# Patient Record
Sex: MALE | HISPANIC OR LATINO
[De-identification: names, ages, dates, MRNs, and addresses within clinical notes are randomized per-mention and may not be internally consistent; named-entity substitution may affect disease eponyms.]

---

## 2021-12-17 ENCOUNTER — NON-APPOINTMENT (OUTPATIENT)
Age: 51
End: 2021-12-17

## 2021-12-17 ENCOUNTER — TRANSCRIPTION ENCOUNTER (OUTPATIENT)
Age: 51
End: 2021-12-17

## 2021-12-17 ENCOUNTER — APPOINTMENT (OUTPATIENT)
Dept: INTERNAL MEDICINE | Facility: CLINIC | Age: 51
End: 2021-12-17
Payer: COMMERCIAL

## 2021-12-17 VITALS
RESPIRATION RATE: 17 BRPM | DIASTOLIC BLOOD PRESSURE: 80 MMHG | SYSTOLIC BLOOD PRESSURE: 124 MMHG | TEMPERATURE: 97.5 F | HEART RATE: 86 BPM | BODY MASS INDEX: 31.65 KG/M2 | WEIGHT: 190 LBS | OXYGEN SATURATION: 98 % | HEIGHT: 65 IN

## 2021-12-17 DIAGNOSIS — E55.9 VITAMIN D DEFICIENCY, UNSPECIFIED: ICD-10-CM

## 2021-12-17 DIAGNOSIS — Z00.00 ENCOUNTER FOR GENERAL ADULT MEDICAL EXAMINATION W/OUT ABNORMAL FINDINGS: ICD-10-CM

## 2021-12-17 DIAGNOSIS — U07.1 COVID-19: ICD-10-CM

## 2021-12-17 DIAGNOSIS — J06.9 ACUTE UPPER RESPIRATORY INFECTION, UNSPECIFIED: ICD-10-CM

## 2021-12-17 DIAGNOSIS — Z91.19 PATIENT'S NONCOMPLIANCE WITH OTHER MEDICAL TREATMENT AND REGIMEN: ICD-10-CM

## 2021-12-17 DIAGNOSIS — Z86.39 PERSONAL HISTORY OF OTHER ENDOCRINE, NUTRITIONAL AND METABOLIC DISEASE: ICD-10-CM

## 2021-12-17 DIAGNOSIS — Z71.89 OTHER SPECIFIED COUNSELING: ICD-10-CM

## 2021-12-17 DIAGNOSIS — L91.0 HYPERTROPHIC SCAR: ICD-10-CM

## 2021-12-17 LAB
FLUAV SPEC QL CULT: NEGATIVE
FLUBV AG SPEC QL IA: NEGATIVE
S PYO AG SPEC QL IA: NEGATIVE

## 2021-12-17 PROCEDURE — 87804 INFLUENZA ASSAY W/OPTIC: CPT | Mod: 59,QW

## 2021-12-17 PROCEDURE — 99396 PREV VISIT EST AGE 40-64: CPT | Mod: 25

## 2021-12-17 PROCEDURE — 99497 ADVNCD CARE PLAN 30 MIN: CPT | Mod: 25

## 2021-12-17 PROCEDURE — 87880 STREP A ASSAY W/OPTIC: CPT | Mod: QW

## 2021-12-17 PROCEDURE — G0442 ANNUAL ALCOHOL SCREEN 15 MIN: CPT

## 2021-12-17 PROCEDURE — 99215 OFFICE O/P EST HI 40 MIN: CPT | Mod: 25

## 2021-12-17 PROCEDURE — 36415 COLL VENOUS BLD VENIPUNCTURE: CPT

## 2021-12-17 PROCEDURE — G0444 DEPRESSION SCREEN ANNUAL: CPT | Mod: 59

## 2021-12-17 NOTE — REVIEW OF SYSTEMS
[Negative] : Heme/Lymph [Postnasal Drip] : postnasal drip [Nasal Discharge] : nasal discharge [Sore Throat] : sore throat

## 2021-12-18 ENCOUNTER — NON-APPOINTMENT (OUTPATIENT)
Age: 51
End: 2021-12-18

## 2021-12-18 DIAGNOSIS — Z86.39 PERSONAL HISTORY OF OTHER ENDOCRINE, NUTRITIONAL AND METABOLIC DISEASE: ICD-10-CM

## 2021-12-18 LAB
25(OH)D3 SERPL-MCNC: 14.5 NG/ML
ALBUMIN SERPL ELPH-MCNC: 4.4 G/DL
ALP BLD-CCNC: 98 U/L
ALT SERPL-CCNC: 39 U/L
ANION GAP SERPL CALC-SCNC: 15 MMOL/L
APPEARANCE: CLEAR
AST SERPL-CCNC: 22 U/L
BASOPHILS # BLD AUTO: 0.04 K/UL
BASOPHILS NFR BLD AUTO: 0.5 %
BILIRUB SERPL-MCNC: 0.6 MG/DL
BILIRUBIN URINE: NEGATIVE
BLOOD URINE: NEGATIVE
BUN SERPL-MCNC: 13 MG/DL
CALCIUM SERPL-MCNC: 9.3 MG/DL
CHLORIDE SERPL-SCNC: 102 MMOL/L
CHOLEST SERPL-MCNC: 221 MG/DL
CO2 SERPL-SCNC: 21 MMOL/L
COLOR: NORMAL
CREAT SERPL-MCNC: 0.72 MG/DL
EOSINOPHIL # BLD AUTO: 0.57 K/UL
EOSINOPHIL NFR BLD AUTO: 7.2 %
ESTIMATED AVERAGE GLUCOSE: 206 MG/DL
GGT SERPL-CCNC: 46 U/L
GLUCOSE QUALITATIVE U: ABNORMAL
GLUCOSE SERPL-MCNC: 310 MG/DL
HBA1C MFR BLD HPLC: 8.8 %
HCT VFR BLD CALC: 43.9 %
HDLC SERPL-MCNC: 49 MG/DL
HGB BLD-MCNC: 15.4 G/DL
IMM GRANULOCYTES NFR BLD AUTO: 0.6 %
KETONES URINE: NORMAL
LDLC SERPL CALC-MCNC: 104 MG/DL
LEUKOCYTE ESTERASE URINE: NEGATIVE
LYMPHOCYTES # BLD AUTO: 1.15 K/UL
LYMPHOCYTES NFR BLD AUTO: 14.5 %
MAN DIFF?: NORMAL
MCHC RBC-ENTMCNC: 33.2 PG
MCHC RBC-ENTMCNC: 35.1 GM/DL
MCV RBC AUTO: 94.6 FL
MONOCYTES # BLD AUTO: 0.5 K/UL
MONOCYTES NFR BLD AUTO: 6.3 %
NEUTROPHILS # BLD AUTO: 5.61 K/UL
NEUTROPHILS NFR BLD AUTO: 70.9 %
NITRITE URINE: NEGATIVE
NONHDLC SERPL-MCNC: 172 MG/DL
PH URINE: 5.5
PLATELET # BLD AUTO: 183 K/UL
POTASSIUM SERPL-SCNC: 3.8 MMOL/L
PROT SERPL-MCNC: 7.9 G/DL
PROTEIN URINE: NEGATIVE
PSA SERPL-MCNC: 0.45 NG/ML
RBC # BLD: 4.64 M/UL
RBC # FLD: 12.8 %
SODIUM SERPL-SCNC: 138 MMOL/L
SPECIFIC GRAVITY URINE: 1.03
TRIGL SERPL-MCNC: 338 MG/DL
TSH SERPL-ACNC: 1.43 UIU/ML
UROBILINOGEN URINE: NORMAL
WBC # FLD AUTO: 7.92 K/UL

## 2021-12-18 RX ORDER — ATORVASTATIN CALCIUM 20 MG/1
20 TABLET, FILM COATED ORAL
Qty: 90 | Refills: 0 | Status: ACTIVE | COMMUNITY
Start: 2021-12-18 | End: 1900-01-01

## 2021-12-18 RX ORDER — CHOLECALCIFEROL (VITAMIN D3) 1250 MCG
1.25 MG CAPSULE ORAL
Qty: 8 | Refills: 0 | Status: ACTIVE | COMMUNITY
Start: 2021-12-18 | End: 1900-01-01

## 2021-12-19 PROBLEM — U07.1 COVID-19 VIRUS INFECTION: Status: ACTIVE | Noted: 2021-12-19

## 2021-12-19 PROBLEM — Z91.19 NONCOMPLIANCE: Status: RESOLVED | Noted: 2021-12-17 | Resolved: 2021-12-19

## 2021-12-19 PROBLEM — Z86.39 HISTORY OF VITAMIN D DEFICIENCY: Status: RESOLVED | Noted: 2021-12-18 | Resolved: 2021-12-19

## 2021-12-19 PROBLEM — L91.0 KELOID SCAR: Status: ACTIVE | Noted: 2021-12-17

## 2021-12-19 PROBLEM — E55.9 VITAMIN D DEFICIENCY: Status: ACTIVE | Noted: 2021-12-19

## 2021-12-19 PROBLEM — Z91.19 NONCOMPLIANCE: Status: ACTIVE | Noted: 2021-12-19

## 2021-12-19 LAB
RAPID RVP RESULT: DETECTED
SARS-COV-2 RNA PNL RESP NAA+PROBE: DETECTED

## 2021-12-19 NOTE — ADDENDUM
[FreeTextEntry1] : Call patient this morning at 8 AM and made him aware that his Covid test was positive.  He understands that he needs to quarantine.  He has a head cold without fever or shortness of breath.  Patient also made aware that his blood sugars were elevated and he needs to take Metformin at thousand twice a day.  Switched his medication to a atorvastatin for cholesterol recommended Metformin 1000 twice daily started vitamin D 50,000 weekly.. Patient told to have all his intimate contacts tested.  For Covid.

## 2021-12-19 NOTE — DATA REVIEWED
[FreeTextEntry1] : Rapid flu rapid strep negative= Covid PCR positive vitamin D 14.5.  Glucose 310.  Triglycerides 338 cholesterol 221 A1c 8.8.

## 2021-12-19 NOTE — PHYSICAL EXAM
[No Acute Distress] : no acute distress [Well Nourished] : well nourished [Well Developed] : well developed [Well-Appearing] : well-appearing [Normal Sclera/Conjunctiva] : normal sclera/conjunctiva [PERRL] : pupils equal round and reactive to light [EOMI] : extraocular movements intact [Normal Outer Ear/Nose] : the outer ears and nose were normal in appearance [No JVD] : no jugular venous distention [No Lymphadenopathy] : no lymphadenopathy [Supple] : supple [Thyroid Normal, No Nodules] : the thyroid was normal and there were no nodules present [No Respiratory Distress] : no respiratory distress  [No Accessory Muscle Use] : no accessory muscle use [Clear to Auscultation] : lungs were clear to auscultation bilaterally [Normal Rate] : normal rate  [Regular Rhythm] : with a regular rhythm [Normal S1, S2] : normal S1 and S2 [No Murmur] : no murmur heard [No Carotid Bruits] : no carotid bruits [No Abdominal Bruit] : a ~M bruit was not heard ~T in the abdomen [No Varicosities] : no varicosities [Pedal Pulses Present] : the pedal pulses are present [No Edema] : there was no peripheral edema [No Palpable Aorta] : no palpable aorta [No Extremity Clubbing/Cyanosis] : no extremity clubbing/cyanosis [Soft] : abdomen soft [Non Tender] : non-tender [Non-distended] : non-distended [No Masses] : no abdominal mass palpated [No HSM] : no HSM [Normal Bowel Sounds] : normal bowel sounds [Normal Posterior Cervical Nodes] : no posterior cervical lymphadenopathy [Normal Anterior Cervical Nodes] : no anterior cervical lymphadenopathy [No CVA Tenderness] : no CVA  tenderness [No Spinal Tenderness] : no spinal tenderness [No Joint Swelling] : no joint swelling [Grossly Normal Strength/Tone] : grossly normal strength/tone [No Rash] : no rash [Coordination Grossly Intact] : coordination grossly intact [No Focal Deficits] : no focal deficits [Normal Gait] : normal gait [Deep Tendon Reflexes (DTR)] : deep tendon reflexes were 2+ and symmetric [Normal Affect] : the affect was normal [Normal Insight/Judgement] : insight and judgment were intact [de-identified] : Obese [de-identified] : Pharynx injected, PNDrip noted [de-identified] : keloid scars posterior head nape of neck is covered in keloid scar==

## 2021-12-19 NOTE — ASSESSMENT
[FreeTextEntry1] : Medical Annual wellness visit completed:\par HRA completed and reviewed with patient\par Medical, family, surgical history reviewed with patient and updated\par List of current providers r/w patient and updated\par Vitals, BMI reviewed and discussed along with healthy BMI goals. Dietary counseling x 15 minutes provided\par Depression PHQ 9 completed and reviewed \par Annual safety assessment reviewed\par discussed advanced directives\par smoking cessation counseling provided\par Established routine screening and immunization schedules\par \par VACCINATION & OTHER TX RECOMMENDATIONS\par \par ASA preventative therapy\par Calcium/Vitamin D supplementation\par  \par Dietary counseling, nutrition referral\par risks vs. benefits d/w patient. routine vaccination and vaccination schedules and recommendation d/w patient\par \par Vaccines recommended: \par * pneumovax (once after 65) & prevnar\par * annual Influenza vaccine\par * Hep B vaccines\par * zostavax\par * Tdap\par \par Colorectal screening recommended; screening colonoscopy q10yr, flex sig q5yr, annual fecal occult testing\par BMD recommended biennially for osteoporosis screening\par Glaucoma screening recommended, annual optho evals\par Cardiovascular screening and blood tests recommended and discussed w/ patient, cholesterol screening and dietary counseling\par AAA recommended x 1\par \par \par Met with SERENA WEBER, who was willing to discuss advance care planning. \par Our advance care planning conversation included a discussion about:\par 1. The value and importance of advance care planning.\par 2. Experiences with loved ones who have been seriously ill or have .\par 3. Exploration of personal, cultural, or spiritual beliefs that might influence medical decisions.\par 4. Exploration of goals of care in the event of a sudden injury or illness.\par 5. Identification of a health care agent.\par 6. Review and update, or completion of, an advance directive.\par Start time: 1115 End time: 1130\par \par diet and exercise weight loss.  Low-salt low-fat ADA diet/ htn- Discussed diabetes physiology\par - Discussed importance of monitoring blood glucose levels\par - Encouraged a low fat/low cholesterol diet\par - Discussed symptoms of hyperglycemia and hypoglycemia\par - Discussed ADA glucose goals\par - Discussed  HGB A1c and the effects of blood glucose on the level\par - Discussed Healthy eating, avoidance of concentrated sweets, and to include vegetables by at least 2 meals a day\par - Discussed regular exercise\par - Discussed importance of follow up physician visits Limit intake of Sodium (Salt) to less than 2 grams a day to prevent fluid retention-swelling or worsening of symptoms. The importance of keeping the blood pressure at or below 130/80 to prevent stroke, heart attacks, kidney failure, blindness, and loss of limbs was  low chol diet. Avoid fried foods, red meat, butter, eggs, hard cheeses. Use canola or olive oil preferred. ::  was established in which goals would be set, monitoring would be done, and problem solving would also be addressed. The patient would be assisted using behavior change techniques, such as self-help and counseling through behavioral modification: Problem solving using hypnosis and positive medical reinforcement to achieve agreed-upon goals.\par \par Symptomatic patients : Test for influenza, if positive, treat for influenza and do not continue below. \par 1. Fever plus cough or shortness of breath : Test for RVP and COVID-19.\par 2.Indirect, circumstantial or unclear exposure to COVID-19, or other concerning cases not meeting above criteria: Please call [facility] to discuss testing. \par +++ All above cases must be reported to the [facility] registry. +++\par \par Asymptomatic patients: \par 1. Known first-degree direct-contact exposure to positive COVID-19 patient but asymptomatic: No testing PLUS 14 day self-quarantine. Pt to call if symptoms develop. Report to [facility] Registry.\par 2. No known exposure and asymptomatic, referred from outside healthcare organization: Please call [facility] to discuss testing. \par 3.All other asymptomatic patients with no known exposures: no testing, no exceptions.\par \par \par

## 2021-12-19 NOTE — HISTORY OF PRESENT ILLNESS
[FreeTextEntry1] : annual wellness, f/u, GHM, htn, diabetes, lipids, obesity, vitamin d def. chief complaint head cold, nasal congestion and cough. [de-identified] : 52 y/o M presents for f/u, GHM, annual wellness\par \par Pt has hx of htn, diabetes, lipids, obesity, vitamin d def\par Pt denies fever, cough, body aches, chills and SOB\par Patient developed nasal congestion and cough feels otherwise okay\par Fully vaccinated against covid-19\par Hx of keloid scars, posterior head/neck \par Pt voices no further acute complaints\par ROS as documented below

## 2021-12-19 NOTE — HEALTH RISK ASSESSMENT
[Good] : ~his/her~  mood as  good [Never] : Never [Yes] : Yes [3 or 4 (1 pt)] : 3 or 4  (1 point) [No falls in past year] : Patient reported no falls in the past year [1] : 2) Feeling down, depressed, or hopeless for several days (1) [PHQ-2 Positive] : PHQ-2 Positive [Several Days (1)] : 7.) Trouble concentrating on things, such as reading a newspaper or watching television? Several days [Not at All (0)] : 8.) Moving or speaking so slowly that other people could have noticed, or the opposite, moving or speaking faster than usual? Not at all [None] : None [Employed] : employed [Less Than High School] : less than high school [] :  [# Of Children ___] : has [unfilled] children [Sexually Active] : sexually active [Feels Safe at Home] : Feels safe at home [Fully functional (bathing, dressing, toileting, transferring, walking, feeding)] : Fully functional (bathing, dressing, toileting, transferring, walking, feeding) [Fully functional (using the telephone, shopping, preparing meals, housekeeping, doing laundry, using] : Fully functional and needs no help or supervision to perform IADLs (using the telephone, shopping, preparing meals, housekeeping, doing laundry, using transportation, managing medications and managing finances) [Reports normal functional visual acuity (ie: able to read med bottle)] : Reports normal functional visual acuity [Smoke Detector] : smoke detector [Carbon Monoxide Detector] : carbon monoxide detector [Safety elements used in home] : safety elements used in home [Reviewed no changes] : Reviewed, no changes [Designated Healthcare Proxy] : Designated healthcare proxy [Name: ___] : Health Care Proxy's Name: [unfilled]  [Relationship: ___] : Relationship: [unfilled] [Aggressive treatment] : aggressive treatment [Last Verification Date: ___] : Last Verification Date: [unfilled] [I will adhere to the patient's wishes.] : I will adhere to the patient's wishes. [Time Spent: ___ minutes] : Time Spent: [unfilled] minutes [Audit-CScore] : 1 [[id number]] : 4 [Change in mental status noted] : No change in mental status noted [Language] : denies difficulty with language [Behavior] : denies difficulty with behavior [Learning/Retaining New Information] : denies difficulty learning/retaining new information [Handling Complex Tasks] : denies difficulty handling complex tasks [Reports changes in hearing] : Reports no changes in hearing [Reports changes in vision] : Reports no changes in vision [Reports changes in dental health] : Reports no changes in dental health [Guns at Home] : no guns at home [Travel to Developing Areas] : does not  travel to developing areas

## 2021-12-21 ENCOUNTER — NON-APPOINTMENT (OUTPATIENT)
Age: 51
End: 2021-12-21

## 2021-12-23 ENCOUNTER — APPOINTMENT (OUTPATIENT)
Dept: INTERNAL MEDICINE | Facility: CLINIC | Age: 51
End: 2021-12-23

## 2022-02-10 ENCOUNTER — RX RENEWAL (OUTPATIENT)
Age: 52
End: 2022-02-10

## 2022-12-19 ENCOUNTER — APPOINTMENT (OUTPATIENT)
Dept: PHYSICAL MEDICINE AND REHAB | Facility: CLINIC | Age: 52
End: 2022-12-19

## 2022-12-19 VITALS
WEIGHT: 195 LBS | DIASTOLIC BLOOD PRESSURE: 90 MMHG | OXYGEN SATURATION: 99 % | TEMPERATURE: 97.1 F | BODY MASS INDEX: 32.49 KG/M2 | SYSTOLIC BLOOD PRESSURE: 130 MMHG | HEIGHT: 65 IN | HEART RATE: 94 BPM

## 2022-12-19 DIAGNOSIS — Z86.79 PERSONAL HISTORY OF OTHER DISEASES OF THE CIRCULATORY SYSTEM: ICD-10-CM

## 2022-12-19 DIAGNOSIS — Z86.39 PERSONAL HISTORY OF OTHER ENDOCRINE, NUTRITIONAL AND METABOLIC DISEASE: ICD-10-CM

## 2022-12-19 DIAGNOSIS — E78.49 OTHER HYPERLIPIDEMIA: ICD-10-CM

## 2022-12-19 PROCEDURE — 99072 ADDL SUPL MATRL&STAF TM PHE: CPT

## 2022-12-19 PROCEDURE — 99204 OFFICE O/P NEW MOD 45 MIN: CPT

## 2022-12-20 ENCOUNTER — RX CHANGE (OUTPATIENT)
Age: 52
End: 2022-12-20

## 2022-12-25 NOTE — REVIEW OF SYSTEMS
[Joint Pain] : joint pain [Joint Stiffness] : joint stiffness [Muscle Pain] : muscle pain [Eye Pain] : no eye pain [Earache] : no earache [Chest Pain] : no chest pain [Shortness Of Breath] : no shortness of breath [Abdominal Pain] : no abdominal pain [Dysuria] : no dysuria [Skin Wound] : no skin wound [Dizziness] : no dizziness [Insomnia] : no insomnia [Easy Bruising] : no tendency for easy bruising

## 2022-12-25 NOTE — PHYSICAL EXAM
[FreeTextEntry1] : Pleasant, in no distress. Language: English\par HEENT: Head: no trauma. Eyes: no discharge. Ears: No discharge. Nose No discharge. Throat: clear\par Neck: FAROM. Negative Spurlings\par Heart: RR, +S1, S2\par Lungs: CTA\par Abdomen: soft, NT\par Lumbar spine: AROM 0-70, diffuse spasms of the left greater than right lumbar paraspinals\par \par LUE: Shoulder:FAROM, MS 5/5\par Elbow: FAROM, MS 5/5 reflexes 2/4\par Wrist: FAROM, MS 5/5 reflexes 2/4\par Warm, nontender, pulse 2+\par \par RUE:Shoulder:FAROM, MS 5/5\par Elbow: FAROM, MS 5/5 reflexes 2/4\par Wrist: FAROM, MS 5/5 reflexes 2/4\par Warm, nontender, pulse 2+\par \par LLE: Hip: FAROM, MS 4+/5\par Knee: FAROM, MS 5-/5 reflexes 2/4\par Ankle: FAROM, MS 5/5 reflexes 2/4\par Warm , nontender, pulse 2+ negative homans\par \par RLE: Hip: FAROM, MS 4+/5\par Knee: FAROM, MS 5-/5 reflexes 2/4\par Ankle: FAROM, MS 5/5 reflexes 2/4\par Warm , nontender, pulse 2+ negative homans\par \par Gait: Spontaneous, reciprocal, safe without an assistive device\par \par Sensation\par RUE: sensation is intact to light touch, pinprick  and proprioception\par LUE: sensation is intact to light touch, pinprick  and proprioception\par RLE: sensation is intact to light touch, pinprick  and proprioception. Neg SLR. Neg JELANI, Neg FADIR\par LLE: sensation is intact to light touch, pinprick  and proprioception positive SLR. Neg JELANI, Neg FADIR\par \par \par

## 2022-12-25 NOTE — HISTORY OF PRESENT ILLNESS
[FreeTextEntry1] : Workers Compensation\par Date of Accident 10.28. 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 He was was trying to remove a manual pallet juan jose from a pallet of medical supplies\par \par The juan jose became stuck. He pulled the juan jose hard toward him. The juan jose ran into him and he fell backward. He landed hard on his back. No LOC. He had low back pain at the scene. He went to ER at [facility],  CT scan of the lumbar spine did not reveal a fracture\par \par X-ray of the lumbar spine performed on October 20, 2022 reveals no fracture\par \par He was seen by Dr. Анна Silva,  chiropracter. He has been to chiropractic care for 3 times a week.\par He had persistent pain and was referred to Dr. Weller of [facility] pain management for an evaluation..\par \par MRI of the lumbar spine performed on December 9, 2022 reveals there is a disc bulge resulting in mild to moderate bilateral foraminal stenosis with impingement on the exiting L5 roots.  There is mild right and moderate left facet arthropathy with a small left facet effusion.\par Received trigger points to the low back.  He has persistent pain.\par \par Pain:  8/10 Worse: 10/10 Quality: sharp Frequency: constant\par The pain starts on the right side and radiates to the left lateral side\par No radiation down the leg\par The pain is worse with bending\par Aggravated with walking and sitting more than 30 minutes.\par \par He takes methocarbamol\par He was not taken nsaids\par \par No previous WC case\par No previous accident to the low back\par \par Functional deficits\par [place] is able to perform ADLs very slowly due to pain\par \par He drove to the office\par He has been off work since the day of the accident.\par \par

## 2023-01-19 ENCOUNTER — APPOINTMENT (OUTPATIENT)
Dept: PHYSICAL MEDICINE AND REHAB | Facility: CLINIC | Age: 53
End: 2023-01-19
Payer: OTHER MISCELLANEOUS

## 2023-01-19 VITALS
RESPIRATION RATE: 17 BRPM | SYSTOLIC BLOOD PRESSURE: 126 MMHG | OXYGEN SATURATION: 98 % | BODY MASS INDEX: 32.49 KG/M2 | WEIGHT: 195 LBS | HEART RATE: 95 BPM | HEIGHT: 65 IN | DIASTOLIC BLOOD PRESSURE: 80 MMHG | TEMPERATURE: 98 F

## 2023-01-19 PROCEDURE — 99072 ADDL SUPL MATRL&STAF TM PHE: CPT

## 2023-01-19 PROCEDURE — 99213 OFFICE O/P EST LOW 20 MIN: CPT

## 2023-01-19 RX ORDER — TIZANIDINE HYDROCHLORIDE 2 MG/1
2 CAPSULE ORAL
Qty: 30 | Refills: 0 | Status: ACTIVE | COMMUNITY
Start: 2023-01-19 | End: 1900-01-01

## 2023-01-23 NOTE — HISTORY OF PRESENT ILLNESS
[FreeTextEntry1] : Workers Compensation\par Date of Accident 10.28. 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 He was was trying to remove a manual pallet juan jose from a pallet of medical supplies\par \par The juan jose became stuck. He pulled the juan jose hard toward him. The juan jose ran into him and he fell backward. He landed hard on his back. No LOC. He had low back pain at the scene. He went to ER at [facility],  CT scan of the lumbar spine did not reveal a fracture\par \par X-ray of the lumbar spine performed on October 20, 2022 reveals no fracture\par \par He was seen by Dr. Анна Silva,  chiropracter. He has been to chiropractic care for 3 times a week.\par He had persistent pain and was referred to Dr. Weller of [facility] pain management for an evaluation..\par \par MRI of the lumbar spine performed on December 9, 2022 reveals there is a disc bulge resulting in mild to moderate bilateral foraminal stenosis with impingement on the exiting L5 roots.  There is mild right and moderate left facet arthropathy with a small left facet effusion.\par Received trigger points to the low back.  He has persistent pain.\par \par The patient has not received approval for physical therapy since his last office visit.\par \par According to the patient he has not received any restorative physical therapy since his initial injury.\par \par Low back pain\par Pain:  8/10 Worse: 10/10 Quality: sharp Frequency: constant\par The pain starts on the right side and radiates to the left lateral side\par No radiation down the leg\par The pain is worse with bending\par Aggravated with walking and sitting more than 30 minutes.\par \par He takes methocarbamol\par He was not taken nsaids\par \par No previous WC case\par No previous accident to the low back\par \par Functional deficits\par He is able to perform ADLs but very slowly due to pain\par He has increased pain in his low back with lifting.  He has increased pain with walking more than several blocks.\par \par He drove to the office\par He has been off work since the day of the accident.\par \par

## 2023-01-30 ENCOUNTER — RX CHANGE (OUTPATIENT)
Age: 53
End: 2023-01-30

## 2023-01-30 ENCOUNTER — APPOINTMENT (OUTPATIENT)
Dept: PHYSICAL MEDICINE AND REHAB | Facility: CLINIC | Age: 53
End: 2023-01-30
Payer: OTHER MISCELLANEOUS

## 2023-01-30 VITALS
WEIGHT: 193 LBS | BODY MASS INDEX: 32.15 KG/M2 | OXYGEN SATURATION: 98 % | DIASTOLIC BLOOD PRESSURE: 82 MMHG | SYSTOLIC BLOOD PRESSURE: 144 MMHG | TEMPERATURE: 97.5 F | HEIGHT: 65 IN | HEART RATE: 93 BPM

## 2023-01-30 PROCEDURE — 99213 OFFICE O/P EST LOW 20 MIN: CPT

## 2023-01-30 PROCEDURE — 99072 ADDL SUPL MATRL&STAF TM PHE: CPT

## 2023-01-31 NOTE — HISTORY OF PRESENT ILLNESS
[FreeTextEntry1] : Workers Compensation\par Date of Accident 10.28. 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 He was was trying to remove a manual pallet juan jose from a pallet of medical supplies\par \par The juan jose became stuck. He pulled the juan jose hard toward him. The juan jose ran into him and he fell backward. He landed hard on his back. No LOC. He had low back pain at the scene. [facility]  CT scan of the lumbar spine did not reveal a fracture\par \par X-ray of the lumbar spine performed on October 20, 2022 reveals no fracture\par \par He was seen by Dr. Анна Silva,  chiropracter. He has been to chiropractic care for 3 times a week.\par He had persistent pain and was referred to Dr. Weller of [facility] pain management for an evaluation..\par \par MRI of the lumbar spine performed on December 9, 2022 reveals there is a disc bulge resulting in mild to moderate bilateral foraminal stenosis with impingement on the exiting L5 roots.  There is mild right and moderate left facet arthropathy with a small left facet effusion.\par Received trigger points to the low back.  He has persistent pain.\par \par The patient has not received approval for physical therapy since his last office visit.\par \par According to the patient he has not received any restorative physical therapy since his initial injury.\par \par The patient returns today stating that he wishes to return to work at an light duty status.\par He has recently been approved for restorative physical therapy\par His medications were approved by Worker's Compensation\par He has been performing stretching and simple exercises in the gym to increase his endurance and back strength\par \par Low back pain\par Pain:  8/10 Worse: 10/10 Quality: sharp Frequency: constant\par The pain starts on the right side and radiates to the left lateral side\par No radiation down the leg\par The pain is worse with bending\par Aggravated with walking and sitting more than 30 minutes.\par \par He takes methocarbamol\par He was not taken nsaids\par \par No previous WC case\par No previous accident to the low back\par \par Functional deficits\par He is able to perform ADLs but very slowly due to pain\par He has increased pain in his low back with lifting.  He has increased pain with walking more than several blocks.\par \par He drove to the office\par He has been off work since the day of the accident.\par \par

## 2023-02-03 ENCOUNTER — RX CHANGE (OUTPATIENT)
Age: 53
End: 2023-02-03

## 2023-02-03 RX ORDER — TIZANIDINE HYDROCHLORIDE 2 MG/1
2 CAPSULE ORAL
Qty: 30 | Refills: 0 | Status: DISCONTINUED | COMMUNITY
Start: 2022-12-19 | End: 2023-02-03

## 2023-02-03 RX ORDER — NAPROXEN 500 MG/1
500 TABLET ORAL
Qty: 60 | Refills: 0 | Status: DISCONTINUED | COMMUNITY
Start: 2022-12-19 | End: 2023-02-03

## 2023-02-03 RX ORDER — AZITHROMYCIN 250 MG/1
250 TABLET, FILM COATED ORAL
Qty: 1 | Refills: 0 | Status: DISCONTINUED | COMMUNITY
Start: 2021-12-17 | End: 2023-02-03

## 2023-02-21 ENCOUNTER — APPOINTMENT (OUTPATIENT)
Dept: PHYSICAL MEDICINE AND REHAB | Facility: CLINIC | Age: 53
End: 2023-02-21

## 2023-02-21 VITALS
TEMPERATURE: 97.1 F | WEIGHT: 193 LBS | HEART RATE: 94 BPM | OXYGEN SATURATION: 97 % | BODY MASS INDEX: 32.15 KG/M2 | HEIGHT: 65 IN | SYSTOLIC BLOOD PRESSURE: 126 MMHG | DIASTOLIC BLOOD PRESSURE: 86 MMHG | RESPIRATION RATE: 17 BRPM

## 2023-02-27 NOTE — HISTORY OF PRESENT ILLNESS
[FreeTextEntry1] : Workers Compensation\par Date of Accident 10.28. 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 He was was trying to remove a manual pallet juan jose from a pallet of medical supplies\par \par The juan jose became stuck. He pulled the juan jose hard toward him. The juan jose ran into him and he fell backward. He landed hard on his back. No LOC. He had low back pain at the scene. He went to ER at [facility],  CT scan of the lumbar spine did not reveal a fracture\par \par X-ray of the lumbar spine performed on October 20, 2022 reveals no fracture\par \par He was seen by Dr. Анна Silva,  chiropracter. He has been to chiropractic care for 3 times a week.\par He had persistent pain and was referred to Dr. Weller of [facility] pain management for an evaluation..\par \par MRI of the lumbar spine performed on December 9, 2022 reveals there is a disc bulge resulting in mild to moderate bilateral foraminal stenosis with impingement on the exiting L5 roots.  There is mild right and moderate left facet arthropathy with a small left facet effusion.\par Received trigger points to the low back.  He has persistent pain.\par \par According to the patient he has not received any restorative physical therapy since his initial injury.\par \par The patient has recently been approved for restorative therapy. Therapy has decreased the pain and range of motion of his low back.\par \par \par The patient is at work at an light duty status.\par He has recently been approved for restorative physical therapy\par His medications were approved by Worker's Compensation\par He has been performing stretching and simple exercises in the gym to increase his endurance and back strength\par \par Low back pain\par Pain:  8/10 Worse: 10/10 Quality: sharp Frequency: constant\par The pain starts on the right side and radiates to the left lateral side\par No radiation down the leg\par The pain is worse with bending\par Aggravated with walking and sitting more than 30 minutes.\par \par He takes methocarbamol\par He was not taken nsaids\par \par No previous WC case\par No previous accident to the low back\par \par Functional deficits\par He is able to perform ADLs but very slowly due to pain\par He has increased pain in his low back with lifting.  He has increased pain with walking more than several blocks.\par \par He drove to the office\par He has been off work since the day of the accident.\par \par

## 2023-02-27 NOTE — PHYSICAL EXAM
[FreeTextEntry1] : Pleasant, in no distress. Language: English\par HEENT: Head: no trauma. Eyes: no discharge. Ears: No discharge. Nose No discharge. Throat: clear\par Neck: FAROM. Negative Spurlings\par Heart: RR, +S1, S2\par Lungs: CTA\par Abdomen: soft, NT\par Lumbar spine: AROM 0-80, diffuse spasms of the left greater than right lumbar paraspinals\par \par LUE: Shoulder:FAROM, MS 5/5\par Elbow: FAROM, MS 5/5 reflexes 2/4\par Wrist: FAROM, MS 5/5 reflexes 2/4\par Warm, nontender, pulse 2+\par \par RUE:Shoulder:FAROM, MS 5/5\par Elbow: FAROM, MS 5/5 reflexes 2/4\par Wrist: FAROM, MS 5/5 reflexes 2/4\par Warm, nontender, pulse 2+\par \par LLE: Hip: FAROM, MS 4+/5\par Knee: FAROM, MS 5-/5 reflexes 2/4\par Ankle: FAROM, MS 5/5 reflexes 2/4\par Warm , nontender, pulse 2+ negative homans\par \par RLE: Hip: FAROM, MS 4+/5\par Knee: FAROM, MS 5-/5 reflexes 2/4\par Ankle: FAROM, MS 5/5 reflexes 2/4\par Warm , nontender, pulse 2+ negative homans\par \par Gait: Spontaneous, reciprocal, safe without an assistive device\par \par Sensation\par RUE: sensation is intact to light touch, pinprick  and proprioception\par LUE: sensation is intact to light touch, pinprick  and proprioception\par RLE: sensation is intact to light touch, pinprick  and proprioception. Neg SLR. Neg JELANI, Neg FADIR\par LLE: sensation is intact to light touch, pinprick  and proprioception positive SLR. Neg JELANI, Neg FADIR\par

## 2023-02-28 ENCOUNTER — APPOINTMENT (OUTPATIENT)
Dept: PHYSICAL MEDICINE AND REHAB | Facility: CLINIC | Age: 53
End: 2023-02-28
Payer: OTHER MISCELLANEOUS

## 2023-02-28 VITALS
OXYGEN SATURATION: 98 % | BODY MASS INDEX: 32.15 KG/M2 | WEIGHT: 193 LBS | HEART RATE: 86 BPM | SYSTOLIC BLOOD PRESSURE: 122 MMHG | DIASTOLIC BLOOD PRESSURE: 82 MMHG | RESPIRATION RATE: 17 BRPM | HEIGHT: 65 IN | TEMPERATURE: 96.5 F

## 2023-02-28 PROCEDURE — 99072 ADDL SUPL MATRL&STAF TM PHE: CPT

## 2023-02-28 PROCEDURE — 99213 OFFICE O/P EST LOW 20 MIN: CPT

## 2023-03-01 NOTE — HISTORY OF PRESENT ILLNESS
[FreeTextEntry1] : Workers Compensation\par Date of Accident 10.28. 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 He was was trying to remove a manual pallet juan jose from a pallet of medical supplies\par \par The juan jose became stuck. He pulled the juan jose hard toward him. The juan jose ran into him and he fell backward. He landed hard on his back. No LOC. He had low back pain at the scene. He went to ER at [facility],  CT scan of the lumbar spine did not reveal a fracture\par \par X-ray of the lumbar spine performed on October 20, 2022 reveals no fracture\par \par He was seen by Dr. Анна Silva,  chiropracter. He has been to chiropractic care for 3 times a week.\par He had persistent pain and was referred to Dr. Weller of [facility] pain management for an evaluation..\par \par MRI of the lumbar spine performed on December 9, 2022 reveals there is a disc bulge resulting in mild to moderate bilateral foraminal stenosis with impingement on the exiting L5 roots.  There is mild right and moderate left facet arthropathy with a small left facet effusion.\par Received trigger points to the low back.  He has persistent pain.\par \par The patient is at work at an light duty status.\par He has recently been approved for restorative physical therapy\par He has been attending restorative therapy 2 x week for 8 sessions\par He has improved range of motion in the low back with less pain.\par He has returned to work at light duty. He does not lift more than 20 lbs. \par His medications were approved by Worker's Compensation\par He has been performing stretching and simple exercises in the gym to increase his endurance and back strength\par \par Low back pain\par Pain:  7/10 Worse: 10/10 Quality: sharp Frequency: constant\par The pain starts on the right side and radiates to the left lateral side\par No radiation down the leg\par The pain is worse with bending\par Aggravated with walking and sitting more than 40 minutes.\par \par He takes methocarbamol\par He was not taken nsaids\par \par No previous WC case\par No previous accident to the low back\par \par Functional deficits\par He is able to perform ADLs but very slowly due to pain\par He has increased pain in his low back with lifting.  He has increased pain with walking more than several blocks.\par \par He drove to the office\par He has been off work since the day of the accident.\par

## 2023-03-01 NOTE — PHYSICAL EXAM
[FreeTextEntry1] : Pleasant, in no distress. Language: English\par HEENT: Head: no trauma. Eyes: no discharge. Ears: No discharge. Nose No discharge. Throat: clear\par Neck: FAROM. Negative Spurlings\par Heart: RR, +S1, S2\par Lungs: CTA\par Abdomen: soft, NT\par Lumbar spine: AROM 0-80, less diffuse spasms of the left greater than right lumbar paraspinals\par \par LUE: Shoulder:FAROM, MS 5/5\par Elbow: FAROM, MS 5/5 reflexes 2/4\par Wrist: FAROM, MS 5/5 reflexes 2/4\par Warm, nontender, pulse 2+\par \par RUE:Shoulder:FAROM, MS 5/5\par Elbow: FAROM, MS 5/5 reflexes 2/4\par Wrist: FAROM, MS 5/5 reflexes 2/4\par Warm, nontender, pulse 2+\par \par LLE: Hip: FAROM, MS 4+/5\par Knee: FAROM, MS 5-/5 reflexes 2/4\par Ankle: FAROM, MS 5/5 reflexes 2/4\par Warm , nontender, pulse 2+ negative homans\par \par RLE: Hip: FAROM, MS 4+/5\par Knee: FAROM, MS 5-/5 reflexes 2/4\par Ankle: FAROM, MS 5/5 reflexes 2/4\par Warm , nontender, pulse 2+ negative homans\par \par Gait: Spontaneous, reciprocal, safe without an assistive device\par he is able to bend at the waist until finger tips are 36 inches off the floor.\par \par Sensation\par RUE: sensation is intact to light touch, pinprick  and proprioception\par LUE: sensation is intact to light touch, pinprick  and proprioception\par RLE: sensation is intact to light touch, pinprick  and proprioception. Neg SLR. Neg JELANI, Neg FADIR\par LLE: sensation is intact to light touch, pinprick  and proprioception positive SLR. Neg JELANI, Neg FADIR\par

## 2023-03-27 ENCOUNTER — RX RENEWAL (OUTPATIENT)
Age: 53
End: 2023-03-27

## 2023-03-28 ENCOUNTER — APPOINTMENT (OUTPATIENT)
Dept: PHYSICAL MEDICINE AND REHAB | Facility: CLINIC | Age: 53
End: 2023-03-28
Payer: OTHER MISCELLANEOUS

## 2023-03-28 VITALS
BODY MASS INDEX: 31.82 KG/M2 | HEIGHT: 65 IN | WEIGHT: 191 LBS | TEMPERATURE: 97.3 F | DIASTOLIC BLOOD PRESSURE: 82 MMHG | OXYGEN SATURATION: 98 % | HEART RATE: 92 BPM | SYSTOLIC BLOOD PRESSURE: 124 MMHG | RESPIRATION RATE: 17 BRPM

## 2023-03-28 PROCEDURE — 99213 OFFICE O/P EST LOW 20 MIN: CPT

## 2023-03-29 NOTE — HISTORY OF PRESENT ILLNESS
[FreeTextEntry1] : Workers Compensation\par Date of Accident 10.28. 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 He was was trying to remove a manual pallet juan jose from a pallet of medical supplies\par \par The juan jose became stuck. He pulled the juan jose hard toward him. The juan jose ran into him and he fell backward. He landed hard on his back. No LOC. He had low back pain at the scene. He went to ER at [facility],  CT scan of the lumbar spine did not reveal a fracture\par \par X-ray of the lumbar spine performed on October 20, 2022 reveals no fracture\par \par He was seen by Dr. Анна Silva,  chiropracter. He has been to chiropractic care for 3 times a week.\par He had persistent pain and was referred to Dr. Carrillo of [facility] pain management for an evaluation..\par \par MRI of the lumbar spine performed on December 9, 2022 reveals there is a disc bulge resulting in mild to moderate bilateral foraminal stenosis with impingement on the exiting L5 roots.  There is mild right and moderate left facet arthropathy with a small left facet effusion.\par Received trigger points to the low back.  He has persistent pain.\par He recently followed up with Dr. Spears who has a pain approval for epidural blocks\par \par The patient is at work at an light duty status.\par He has recently been approved for restorative physical therapy\par He has been attending restorative therapy 2 x week for 8 sessions He has been to 16 sessions.  He is able to lift 30 pounds 1 time.  He continues to focus on work conditioning and increasing lifting capacity.\par \par Therapy has improved his range of motion and back strength.  He still feels a deep ache in the middle of his back with an occasional radiation down his leg\par \par He has returned to work at light duty. He does not lift more than 20 lbs. \par His medications were approved by Worker's Compensation\par He has been performing stretching and simple exercises in the gym to increase his endurance and back strength\par \par Low back pain\par Pain:  7/10 Worse: 10/10 Quality: sharp Frequency: constant\par The pain starts on the right side and radiates to the left lateral side\par No radiation down the leg\par The pain is worse with bending\par Aggravated with walking and sitting more than 40 minutes.\par \par He takes methocarbamol\par He was not taken nsaids\par \par No previous WC case\par No previous accident to the low back\par \par Functional deficits\par He is able to perform ADLs but very slowly due to pain\par He has increased pain in his low back with lifting.  He has increased pain with walking more than several blocks.\par \par He drove to the office\par He has been off work since the day of the accident.\par

## 2023-03-29 NOTE — PHYSICAL EXAM
[FreeTextEntry1] : Pleasant, in no distress. Language: English\par HEENT: Head: no trauma. Eyes: no discharge. Ears: No discharge. Nose No discharge. Throat: clear\par Neck: FAROM. Negative Spurlings\par Heart: RR, +S1, S2\par Lungs: CTA\par Abdomen: soft, NT\par Lumbar spine: AROM 0-80, less diffuse spasms of the left greater than right lumbar paraspinals\par \par LUE: Shoulder:FAROM, MS 5/5\par Elbow: FAROM, MS 5/5 reflexes 2/4\par Wrist: FAROM, MS 5/5 reflexes 2/4\par Warm, nontender, pulse 2+\par \par RUE:Shoulder:FAROM, MS 5/5\par Elbow: FAROM, MS 5/5 reflexes 2/4\par Wrist: FAROM, MS 5/5 reflexes 2/4\par Warm, nontender, pulse 2+\par \par LLE: Hip: FAROM, MS 4+/5\par Knee: FAROM, MS 5-/5 reflexes 2/4\par Ankle: FAROM, MS 5/5 reflexes 2/4\par Warm , nontender, pulse 2+ negative homans\par \par RLE: Hip: FAROM, MS 4+/5\par Knee: FAROM, MS 5-/5 reflexes 1/4\par Ankle: FAROM, MS 5/5 reflexes 2/4\par Warm , nontender, pulse 2+ negative homans\par \par Gait: Spontaneous, reciprocal, safe without an assistive device\par he is able to bend at the waist until finger tips are 36 inches off the floor.\par \par Sensation\par RUE: sensation is intact to light touch, pinprick  and proprioception\par LUE: sensation is intact to light touch, pinprick  and proprioception\par RLE: sensation is intact to light touch, pinprick  and proprioception. Neg SLR. Neg JELANI, Neg FADIR\par LLE: sensation is intact to light touch, pinprick  and proprioception positive SLR. Neg JELANI, Neg FADIR\par

## 2023-04-04 ENCOUNTER — RESULT CHARGE (OUTPATIENT)
Age: 53
End: 2023-04-04

## 2023-04-04 ENCOUNTER — APPOINTMENT (OUTPATIENT)
Dept: INTERNAL MEDICINE | Facility: CLINIC | Age: 53
End: 2023-04-04
Payer: SELF-PAY

## 2023-04-04 VITALS
HEART RATE: 84 BPM | BODY MASS INDEX: 31.82 KG/M2 | SYSTOLIC BLOOD PRESSURE: 154 MMHG | RESPIRATION RATE: 17 BRPM | TEMPERATURE: 97.1 F | HEIGHT: 65 IN | WEIGHT: 191 LBS | OXYGEN SATURATION: 98 % | DIASTOLIC BLOOD PRESSURE: 82 MMHG

## 2023-04-04 DIAGNOSIS — Z00.00 ENCOUNTER FOR GENERAL ADULT MEDICAL EXAMINATION W/OUT ABNORMAL FINDINGS: ICD-10-CM

## 2023-04-04 DIAGNOSIS — E66.9 OBESITY, UNSPECIFIED: ICD-10-CM

## 2023-04-04 DIAGNOSIS — R73.9 HYPERGLYCEMIA, UNSPECIFIED: ICD-10-CM

## 2023-04-04 DIAGNOSIS — R79.89 OTHER SPECIFIED ABNORMAL FINDINGS OF BLOOD CHEMISTRY: ICD-10-CM

## 2023-04-04 DIAGNOSIS — Z86.79 PERSONAL HISTORY OF OTHER DISEASES OF THE CIRCULATORY SYSTEM: ICD-10-CM

## 2023-04-04 DIAGNOSIS — E11.9 TYPE 2 DIABETES MELLITUS W/OUT COMPLICATIONS: ICD-10-CM

## 2023-04-04 DIAGNOSIS — Z86.39 PERSONAL HISTORY OF OTHER ENDOCRINE, NUTRITIONAL AND METABOLIC DISEASE: ICD-10-CM

## 2023-04-04 PROCEDURE — 99396 PREV VISIT EST AGE 40-64: CPT

## 2023-04-04 PROCEDURE — 99215 OFFICE O/P EST HI 40 MIN: CPT | Mod: 25

## 2023-04-04 RX ORDER — METFORMIN HYDROCHLORIDE 1000 MG/1
1000 TABLET, COATED ORAL
Qty: 180 | Refills: 3 | Status: ACTIVE | COMMUNITY
Start: 2021-12-18 | End: 1900-01-01

## 2023-04-04 RX ORDER — AMLODIPINE BESYLATE 10 MG/1
10 TABLET ORAL
Qty: 90 | Refills: 3 | Status: ACTIVE | COMMUNITY
Start: 2023-04-04 | End: 1900-01-01

## 2023-04-04 RX ORDER — LOSARTAN POTASSIUM 25 MG/1
25 TABLET, FILM COATED ORAL DAILY
Qty: 90 | Refills: 3 | Status: ACTIVE | COMMUNITY
Start: 2023-04-04 | End: 1900-01-01

## 2023-04-10 LAB
25(OH)D3 SERPL-MCNC: 21.2 NG/ML
ALBUMIN SERPL ELPH-MCNC: 4.4 G/DL
ALP BLD-CCNC: 90 U/L
ALT SERPL-CCNC: 42 U/L
ANION GAP SERPL CALC-SCNC: 15 MMOL/L
AST SERPL-CCNC: 26 U/L
BASOPHILS # BLD AUTO: 0.05 K/UL
BASOPHILS NFR BLD AUTO: 0.7 %
BILIRUB SERPL-MCNC: 0.7 MG/DL
BUN SERPL-MCNC: 12 MG/DL
CALCIUM SERPL-MCNC: 9.3 MG/DL
CHLORIDE SERPL-SCNC: 100 MMOL/L
CHOLEST SERPL-MCNC: 177 MG/DL
CO2 SERPL-SCNC: 23 MMOL/L
CREAT SERPL-MCNC: 0.72 MG/DL
EGFR: 111 ML/MIN/1.73M2
EOSINOPHIL # BLD AUTO: 0.75 K/UL
EOSINOPHIL NFR BLD AUTO: 10.1 %
ESTIMATED AVERAGE GLUCOSE: 194 MG/DL
GLUCOSE BLDC GLUCOMTR-MCNC: 223
GLUCOSE SERPL-MCNC: 218 MG/DL
HBA1C MFR BLD HPLC: 8.4 %
HCT VFR BLD CALC: 43.8 %
HDLC SERPL-MCNC: 47 MG/DL
HGB BLD-MCNC: 15.5 G/DL
IMM GRANULOCYTES NFR BLD AUTO: 0.8 %
LDLC SERPL CALC-MCNC: 83 MG/DL
LYMPHOCYTES # BLD AUTO: 1.13 K/UL
LYMPHOCYTES NFR BLD AUTO: 15.2 %
MAN DIFF?: NORMAL
MCHC RBC-ENTMCNC: 33.1 PG
MCHC RBC-ENTMCNC: 35.4 GM/DL
MCV RBC AUTO: 93.6 FL
MONOCYTES # BLD AUTO: 0.4 K/UL
MONOCYTES NFR BLD AUTO: 5.4 %
NEUTROPHILS # BLD AUTO: 5.06 K/UL
NEUTROPHILS NFR BLD AUTO: 67.8 %
NONHDLC SERPL-MCNC: 130 MG/DL
PLATELET # BLD AUTO: 199 K/UL
POTASSIUM SERPL-SCNC: 4 MMOL/L
PROT SERPL-MCNC: 8.2 G/DL
PSA FREE FLD-MCNC: 49 %
PSA FREE SERPL-MCNC: 0.23 NG/ML
PSA SERPL-MCNC: 0.48 NG/ML
RBC # BLD: 4.68 M/UL
RBC # FLD: 13.3 %
SODIUM SERPL-SCNC: 137 MMOL/L
TRIGL SERPL-MCNC: 234 MG/DL
TSH SERPL-ACNC: 1.36 UIU/ML
WBC # FLD AUTO: 7.45 K/UL

## 2023-04-11 PROBLEM — E11.9 DIABETES MELLITUS, TYPE 2: Status: ACTIVE | Noted: 2023-04-04

## 2023-04-11 PROBLEM — Z86.39 HISTORY OF HYPERLIPIDEMIA: Status: RESOLVED | Noted: 2021-12-17 | Resolved: 2023-04-11

## 2023-04-11 PROBLEM — R79.89 LOW VITAMIN D LEVEL: Status: ACTIVE | Noted: 2023-04-11

## 2023-04-11 PROBLEM — Z86.79 HISTORY OF HYPERTENSION: Status: RESOLVED | Noted: 2021-12-17 | Resolved: 2023-04-11

## 2023-04-11 PROBLEM — E66.9 OBESITY (BMI 30-39.9): Status: ACTIVE | Noted: 2023-04-11

## 2023-04-11 PROBLEM — Z86.39 HISTORY OF OBESITY: Status: ACTIVE | Noted: 2021-12-17

## 2023-04-11 NOTE — HEALTH RISK ASSESSMENT
[No] : No [0] : 2) Feeling down, depressed, or hopeless: Not at all (0) [PHQ-2 Negative - No further assessment needed] : PHQ-2 Negative - No further assessment needed [Never] : Never [[id number]] : 0

## 2023-04-11 NOTE — HISTORY OF PRESENT ILLNESS
[FreeTextEntry1] : annual wellness  [de-identified] : 51 y/o male presents for annual wellness exam. He reports having elevated sugar, and states his FS was 370 today at home. He feels otherwise well and reports no other acute complaints or concerns. ROS as documented below.\par

## 2023-04-11 NOTE — END OF VISIT
[Time Spent: ___ minutes] : I have spent [unfilled] minutes of time on the encounter. [FreeTextEntry4] : I Elif Gaston am scribing for and in the presence of Dr. Brett Carolina, the following sections: HISTORY OF PRESENT ILLNESS, PAST MEDICAL/FAMILY/SOCIAL HISTORY, REVIEW OF SYSTEMS, PHYSICAL EXAM; DISPOSITION.\par \par I personally performed the services described in the documentation, reviewed the documentation recorded by the scribe in my presence and it accurately and completely records my words and actions.

## 2023-04-11 NOTE — HISTORY OF PRESENT ILLNESS
[FreeTextEntry1] : annual wellness  [de-identified] : 53 y/o male presents for annual wellness exam. He reports having elevated sugar, and states his FS was 370 today at home. He feels otherwise well and reports no other acute complaints or concerns. ROS as documented below.\par

## 2023-04-11 NOTE — REVIEW OF SYSTEMS
[Fever] : no fever [Chills] : no chills [Recent Change In Weight] : ~T no recent weight change [Vision Problems] : no vision problems [Earache] : no earache [Nasal Discharge] : no nasal discharge [Sore Throat] : no sore throat [Chest Pain] : no chest pain [Palpitations] : no palpitations [Shortness Of Breath] : no shortness of breath [Wheezing] : no wheezing [Abdominal Pain] : no abdominal pain [Nausea] : no nausea [Diarrhea] : no diarrhea [Vomiting] : no vomiting [Dysuria] : no dysuria [Hesitancy] : no hesitancy [Hematuria] : no hematuria [Frequency] : no frequency [Back Pain] : no back pain [Skin Rash] : no skin rash [Headache] : no headache [Dizziness] : no dizziness [Anxiety] : no anxiety [Depression] : no depression [Swollen Glands] : no swollen glands

## 2023-04-11 NOTE — HEALTH RISK ASSESSMENT
[No] : No [0] : 2) Feeling down, depressed, or hopeless: Not at all (0) [PHQ-2 Negative - No further assessment needed] : PHQ-2 Negative - No further assessment needed [Never] : Never [[id number]] : 0

## 2023-04-12 ENCOUNTER — NON-APPOINTMENT (OUTPATIENT)
Age: 53
End: 2023-04-12

## 2023-04-12 RX ORDER — BLOOD-GLUCOSE SENSOR
EACH MISCELLANEOUS
Qty: 2 | Refills: 2 | Status: ACTIVE | COMMUNITY
Start: 2023-04-12 | End: 1900-01-01

## 2023-04-14 RX ORDER — EMPAGLIFLOZIN 25 MG/1
25 TABLET, FILM COATED ORAL DAILY
Qty: 90 | Refills: 3 | Status: ACTIVE | COMMUNITY
Start: 2023-04-04 | End: 1900-01-01

## 2023-04-24 ENCOUNTER — RX RENEWAL (OUTPATIENT)
Age: 53
End: 2023-04-24

## 2023-04-25 ENCOUNTER — APPOINTMENT (OUTPATIENT)
Dept: PHYSICAL MEDICINE AND REHAB | Facility: CLINIC | Age: 53
End: 2023-04-25
Payer: OTHER MISCELLANEOUS

## 2023-04-25 VITALS
OXYGEN SATURATION: 97 % | BODY MASS INDEX: 32.82 KG/M2 | TEMPERATURE: 97.1 F | HEART RATE: 98 BPM | RESPIRATION RATE: 17 BRPM | WEIGHT: 197 LBS | DIASTOLIC BLOOD PRESSURE: 84 MMHG | SYSTOLIC BLOOD PRESSURE: 122 MMHG | HEIGHT: 65 IN

## 2023-04-25 PROCEDURE — 99213 OFFICE O/P EST LOW 20 MIN: CPT

## 2023-04-26 NOTE — HISTORY OF PRESENT ILLNESS
[FreeTextEntry1] : Workers Compensation\par Date of Accident 10.28. 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 He was was trying to remove a manual pallet juan jose from a pallet of medical supplies\par \par The juan jose became stuck. He pulled the juan jose hard toward him. The juan jose ran into him and he fell backward. He landed hard on his back. No LOC. He had low back pain at the scene. He went to ER at [facility],  CT scan of the lumbar spine did not reveal a fracture\par \par X-ray of the lumbar spine performed on October 20, 2022 reveals no fracture\par \par He was seen by Dr. Анна Silva,  chiropracter. He has been to chiropractic care for 3 times a week.\par He had persistent pain and was referred to Dr. Carrillo of [facility] pain management for an evaluation..\par \par MRI of the lumbar spine performed on December 9, 2022 reveals there is a disc bulge resulting in mild to moderate bilateral foraminal stenosis with impingement on the exiting L5 roots.  There is mild right and moderate left facet arthropathy with a small left facet effusion.\par Received trigger points to the low back.  He has persistent pain.\par He recently followed up with Dr. Medina who has a pain approval for epidural blocks\par He was supposed  to receive an epidural block but his sugars were too high and this was deferred for next week\par \par The patient remains at work at a light duty status.  \par He has returned to work at light duty. He does not lift more than 20 lbs. \par \par His medications were approved by Worker's Compensation\par He has been performing stretching and simple exercises in the gym to increase his endurance and back strength\par \par He attends restorative physical therapy 2 times a week for 24 sessions \par Therapy continues to focus on work conditioning and increasing lifting capacity. He is able to lift 40 pounds 1 time. \par Therapy also helps him have less pain with bending\par Therapy has improved his range of motion and back strength.  He still feels a deep ache in the middle of his back with an occasional radiation down his leg\par \par Low back pain\par Pain:  5/10 Worse: 10/10 Quality: sharp Frequency: constant\par The pain starts on the right side and radiates to the left lateral side\par No radiation down the leg\par The pain is worse with bending\par Aggravated with walking and sitting more than 40 minutes.\par \par He takes methocarbamol\par He was not taken nsaids\par \par No previous WC case\par No previous accident to the low back\par \par Functional deficits\par He is able to perform ADLs but very slowly due to pain\par He has increased pain in his low back with lifting.  He has increased pain with walking more than several blocks.\par \par He drove to the office\par He has been off work since the day of the accident.\par

## 2023-04-26 NOTE — REVIEW OF SYSTEMS
[Joint Pain] : joint pain [Joint Stiffness] : joint stiffness [Muscle Pain] : muscle pain [Eye Pain] : no eye pain [Earache] : no earache [Chest Pain] : no chest pain [Shortness Of Breath] : no shortness of breath [Dysuria] : no dysuria [Abdominal Pain] : no abdominal pain [Skin Wound] : no skin wound [Insomnia] : no insomnia [Dizziness] : no dizziness [Easy Bruising] : no tendency for easy bruising

## 2023-05-10 ENCOUNTER — APPOINTMENT (OUTPATIENT)
Dept: PHYSICAL MEDICINE AND REHAB | Facility: CLINIC | Age: 53
End: 2023-05-10
Payer: OTHER MISCELLANEOUS

## 2023-05-10 ENCOUNTER — NON-APPOINTMENT (OUTPATIENT)
Age: 53
End: 2023-05-10

## 2023-05-10 VITALS
RESPIRATION RATE: 17 BRPM | BODY MASS INDEX: 32.82 KG/M2 | OXYGEN SATURATION: 98 % | HEART RATE: 108 BPM | TEMPERATURE: 97.4 F | SYSTOLIC BLOOD PRESSURE: 150 MMHG | HEIGHT: 65 IN | DIASTOLIC BLOOD PRESSURE: 92 MMHG | WEIGHT: 197 LBS

## 2023-05-10 PROCEDURE — 99212 OFFICE O/P EST SF 10 MIN: CPT

## 2023-05-10 NOTE — HISTORY OF PRESENT ILLNESS
[FreeTextEntry1] : Workers Compensation\par Date of Accident 10.28. 2022\par CASE # [id number] \par Areas affected: Low back pain\par Present work status: at work light duty\par Disability status 50 % total temporary disabled\par \par \par He presents  today because his job is asking a functional capacity to be completed. 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 He was was trying to remove a manual pallet juan jose from a pallet of medical supplies\par \par The juan jose became stuck. He pulled the juan jose hard toward him. The juan jose ran into him and he fell backward. He landed hard on his back. No LOC. He had low back pain at the scene. He went to ER at [facility],  CT scan of the lumbar spine did not reveal a fracture\par \par X-ray of the lumbar spine performed on October 20, 2022 reveals no fracture\par \par He was seen by Dr. Анна Silva,  chiropracter. He has been to chiropractic care for 3 times a week.\par He had persistent pain and was referred to Dr. Carrillo of [facility] pain management for an evaluation..\par \par MRI of the lumbar spine performed on December 9, 2022 reveals there is a disc bulge resulting in mild to moderate bilateral foraminal stenosis with impingement on the exiting L5 roots.  There is mild right and moderate left facet arthropathy with a small left facet effusion.\par Received trigger points to the low back.  He has persistent pain.\par He recently followed up with Dr. Medina who has a pain approval for epidural blocks\par He was supposed  to receive an epidural block but his sugars were too high and this was deferred for next week\par \par The patient remains at work at a light duty status.  \par He has returned to work at light duty. He does not lift more than 20 lbs. \par \par His medications were approved by Worker's Compensation\par He has been performing stretching and simple exercises in the gym to increase his endurance and back strength\par \par He attends restorative physical therapy 2 times a week for 28 sessions \par Therapy continues to focus on work conditioning and increasing lifting capacity. He is able to lift 40 pounds 1 time. \par Therapy also helps him have less pain with bending\par Therapy has improved his range of motion and back strength.  He still feels a deep ache in the middle of his back with an occasional radiation down his leg\par \par Low back pain\par Pain:  5/10 Worse: 10/10 Quality: sharp Frequency: constant\par The pain starts on the right side and radiates to the left lateral side\par No radiation down the leg\par The pain is worse with bending\par Aggravated with walking and sitting more than 40 minutes.\par \par He takes methocarbamol\par He was not taken nsaids\par \par No previous WC case\par No previous accident to the low back\par \par Functional deficits\par He is able to perform ADLs but very slowly due to pain\par He has increased pain in his low back with lifting.  He has increased pain with walking more than several blocks.\par \par He drove to the office\par He has been off work since the day of the accident.  He has returned to work and is working at a light duty status of no lifting more than 25 pounds sitting alternating with standing.\par

## 2023-05-10 NOTE — HISTORY OF PRESENT ILLNESS
[FreeTextEntry1] : Workers Compensation\par Date of Accident 10.28. 2022\par CASE # [id number] \par Areas affected: Low back pain\par Present work status: at work light duty\par Disability status 50 % total temporary disabled\par \par \par He presents  today because his job is asking a functional capacity to be completed. 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 He was was trying to remove a manual pallet juan jose from a pallet of medical supplies\par \par The juan jose became stuck. He pulled the juan jose hard toward him. The juan jose ran into him and he fell backward. He landed hard on his back. No LOC. He had low back pain at the scene. He went to ER at [facility],  CT scan of the lumbar spine did not reveal a fracture\par \par X-ray of the lumbar spine performed on October 20, 2022 reveals no fracture\par \par He was seen by Dr. Анна Silva,  chiropracter. He has been to chiropractic care for 3 times a week.\par He had persistent pain and was referred to Dr. Carrillo of [facility] pain management for an evaluation..\par \par MRI of the lumbar spine performed on December 9, 2022 reveals there is a disc bulge resulting in mild to moderate bilateral foraminal stenosis with impingement on the exiting L5 roots.  There is mild right and moderate left facet arthropathy with a small left facet effusion.\par Received trigger points to the low back.  He has persistent pain.\par He recently followed up with Dr. Medina who has a pain approval for epidural blocks\par He was supposed  to receive an epidural block but his sugars were too high and this was deferred for next week\par \par The patient remains at work at a light duty status.  \par He has returned to work at light duty. He does not lift more than 20 lbs. \par \par His medications were approved by Worker's Compensation\par He has been performing stretching and simple exercises in the gym to increase his endurance and back strength\par \par He attends restorative physical therapy 2 times a week for 28 sessions \par Therapy continues to focus on work conditioning and increasing lifting capacity. He is able to lift 40 pounds 1 time. \par Therapy also helps him have less pain with bending\par Therapy has improved his range of motion and back strength.  He still feels a deep ache in the middle of his back with an occasional radiation down his leg\par \par Low back pain\par Pain:  5/10 Worse: 10/10 Quality: sharp Frequency: constant\par The pain starts on the right side and radiates to the left lateral side\par No radiation down the leg\par The pain is worse with bending\par Aggravated with walking and sitting more than 40 minutes.\par \par He takes methocarbamol\par He was not taken nsaids\par \par No previous WC case\par No previous accident to the low back\par \par Functional deficits\par He is able to perform ADLs but very slowly due to pain\par He has increased pain in his low back with lifting.  He has increased pain with walking more than several blocks.\par \par He drove to the office\par He has been off work since the day of the accident.  He has returned to work and is working at a light duty status of no lifting more than 25 pounds sitting alternating with standing.\par

## 2023-05-25 ENCOUNTER — NON-APPOINTMENT (OUTPATIENT)
Age: 53
End: 2023-05-25

## 2023-05-25 ENCOUNTER — APPOINTMENT (OUTPATIENT)
Dept: PHYSICAL MEDICINE AND REHAB | Facility: CLINIC | Age: 53
End: 2023-05-25
Payer: OTHER MISCELLANEOUS

## 2023-05-25 VITALS
DIASTOLIC BLOOD PRESSURE: 80 MMHG | HEART RATE: 80 BPM | SYSTOLIC BLOOD PRESSURE: 110 MMHG | TEMPERATURE: 97.5 F | HEIGHT: 65 IN | BODY MASS INDEX: 32.82 KG/M2 | RESPIRATION RATE: 17 BRPM | WEIGHT: 197 LBS | OXYGEN SATURATION: 98 %

## 2023-05-25 PROCEDURE — 99213 OFFICE O/P EST LOW 20 MIN: CPT

## 2023-06-04 NOTE — HISTORY OF PRESENT ILLNESS
[FreeTextEntry1] : Workers Compensation\par Date of Accident 10.28. 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 He was was trying to remove a manual pallet jack from a pallet of medical supplies\par \par The jack became stuck. He pulled the jack hard toward him. The jack ran into him and he fell backward. He landed hard on his back. No LOC. He had low back pain at the scene. He went to ER at [facility],  CT scan of the lumbar spine did not reveal a fracture\par \par X-ray of the lumbar spine performed on October 20, 2022 reveals no fracture\par \par He was seen by Dr. Анна Silva,  chiropracter. He has been to chiropractic care for 3 times a week.\par He had persistent pain and was referred to Dr. Carrillo of [facility] pain management for an evaluation..\par \par MRI of the lumbar spine performed on December 9, 2022 reveals there is a disc bulge resulting in mild to moderate bilateral foraminal stenosis with impingement on the exiting L5 roots.  There is mild right and moderate left facet arthropathy with a small left facet effusion.\par Received trigger points to the low back.  He has persistent pain.\par He recently followed up with Dr. Medina who has a pain approval for epidural blocks\par He had the injection May 16, 2023. he has increased ROM of the back. He his able to walk with out pain.  He stopped taking pain medications.  He still has tightness  in his back at the end of the day\par \par The patient remains at work at a light duty status.  \par He has returned to work at light duty. He does not lift more than 20 lbs. \par \par His medications were approved by Worker's Compensation\par He has been performing stretching and simple exercises in the gym to increase his endurance and back strength\par \par He attends restorative physical therapy 2 times a week for 8 sessions for  36  sessions \par Therapy continues to focus on work conditioning and increasing lifting capacity. He is able to lift  50 pounds 1 time. \par Therapy also helps him have less pain with bending\par Therapy has improved his range of motion and back strength.  He still feels a deep ache in the middle of his back with an occasional radiation down his leg\par \par Low back pain\par Pain:  3/10 Worse: 10/10 Quality: sharp Frequency: constant\par The pain starts on the right side and radiates to the left lateral side\par No radiation down the leg\par The pain is worse with bending\par Aggravated with walking and sitting more than 60 minutes.\par \par He only uses methocarbamol and NSAIDs as needed for pain\par \par No previous WC case\par No previous accident to the low back\par \par Functional deficits\par He is able to perform ADLs but very slowly due to pain\par He has increased pain in his low back with lifting.  He has increased pain with walking more than several blocks.\par \par He drove to the office\par He has been off work since the day of the accident.  He has returned to work and is working at a light duty status of no lifting more than 25 pounds sitting alternating with standing.\par

## 2023-06-04 NOTE — REVIEW OF SYSTEMS
[Joint Pain] : joint pain [Joint Stiffness] : joint stiffness [Muscle Pain] : muscle pain [Eye Pain] : no eye pain [Earache] : no earache [Chest Pain] : no chest pain [Shortness Of Breath] : no shortness of breath [Dysuria] : no dysuria [Abdominal Pain] : no abdominal pain [Skin Wound] : no skin wound [Dizziness] : no dizziness [Insomnia] : no insomnia [Easy Bruising] : no tendency for easy bruising

## 2023-06-04 NOTE — HISTORY OF PRESENT ILLNESS
[FreeTextEntry1] : Workers Compensation\par Date of Accident 10.28. 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 He was was trying to remove a manual pallet jack from a pallet of medical supplies\par \par The jack became stuck. He pulled the jack hard toward him. The jack ran into him and he fell backward. He landed hard on his back. No LOC. He had low back pain at the scene. He went to ER at [facility],  CT scan of the lumbar spine did not reveal a fracture\par \par X-ray of the lumbar spine performed on October 20, 2022 reveals no fracture\par \par He was seen by Dr. Анна Silva,  chiropracter. He has been to chiropractic care for 3 times a week.\par He had persistent pain and was referred to Dr. Carrillo of [facility] pain management for an evaluation..\par \par MRI of the lumbar spine performed on December 9, 2022 reveals there is a disc bulge resulting in mild to moderate bilateral foraminal stenosis with impingement on the exiting L5 roots.  There is mild right and moderate left facet arthropathy with a small left facet effusion.\par Received trigger points to the low back.  He has persistent pain.\par He recently followed up with Dr. Medina who has a pain approval for epidural blocks\par He had the injection May 16, 2023. he has increased ROM of the back. He his able to walk with out pain.  He stopped taking pain medications.  He still has tightness  in his back at the end of the day\par \par The patient remains at work at a light duty status.  \par He has returned to work at light duty. He does not lift more than 20 lbs. \par \par His medications were approved by Worker's Compensation\par He has been performing stretching and simple exercises in the gym to increase his endurance and back strength\par \par He attends restorative physical therapy 2 times a week for 8 sessions for  36  sessions \par Therapy continues to focus on work conditioning and increasing lifting capacity. He is able to lift  50 pounds 1 time. \par Therapy also helps him have less pain with bending\par Therapy has improved his range of motion and back strength.  He still feels a deep ache in the middle of his back with an occasional radiation down his leg\par \par Low back pain\par Pain:  3/10 Worse: 10/10 Quality: sharp Frequency: constant\par The pain starts on the right side and radiates to the left lateral side\par No radiation down the leg\par The pain is worse with bending\par Aggravated with walking and sitting more than 60 minutes.\par \par He only uses methocarbamol and NSAIDs as needed for pain\par \par No previous WC case\par No previous accident to the low back\par \par Functional deficits\par He is able to perform ADLs but very slowly due to pain\par He has increased pain in his low back with lifting.  He has increased pain with walking more than several blocks.\par \par He drove to the office\par He has been off work since the day of the accident.  He has returned to work and is working at a light duty status of no lifting more than 25 pounds sitting alternating with standing.\par

## 2023-06-05 ENCOUNTER — RX CHANGE (OUTPATIENT)
Age: 53
End: 2023-06-05

## 2023-06-14 ENCOUNTER — APPOINTMENT (OUTPATIENT)
Dept: PHYSICAL MEDICINE AND REHAB | Facility: CLINIC | Age: 53
End: 2023-06-14
Payer: OTHER MISCELLANEOUS

## 2023-06-14 VITALS
SYSTOLIC BLOOD PRESSURE: 130 MMHG | TEMPERATURE: 96.5 F | OXYGEN SATURATION: 98 % | DIASTOLIC BLOOD PRESSURE: 80 MMHG | RESPIRATION RATE: 17 BRPM | HEART RATE: 95 BPM

## 2023-06-14 PROCEDURE — 99214 OFFICE O/P EST MOD 30 MIN: CPT

## 2023-06-15 NOTE — HISTORY OF PRESENT ILLNESS
[FreeTextEntry1] : Workers Compensation\par Date of Accident 10.28. 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 He was was trying to remove a manual pallet jack from a pallet of medical supplies\par \par The jack became stuck. He pulled the jack hard toward him. The jack ran into him and he fell backward. He landed hard on his back. No LOC. He had low back pain at the scene. He went to ER at [facility],  CT scan of the lumbar spine did not reveal a fracture\par \par X-ray of the lumbar spine performed on October 20, 2022 reveals no fracture\par \par He was seen by Dr. Анна Silva,  chiropracter. He has been to chiropractic care for 3 times a week.\par He had persistent pain and was referred to Dr. Carrillo of [facility] pain management for an evaluation..\par \par MRI of the lumbar spine performed on December 9, 2022 reveals there is a disc bulge resulting in mild to moderate bilateral foraminal stenosis with impingement on the exiting L5 roots.  There is mild right and moderate left facet arthropathy with a small left facet effusion.\par Received trigger points to the low back.  He has persistent pain.\par \par He had the injection May 16, 2023. he has increased ROM of the back. \par His low back pain improved significantly\par He recently followed up with Dr. Medina who has a pain approval for epidural blocks\par \par The patient remains at work at a light duty status.  \par He has returned to work at light duty. He does not lift more than 50 lbs. \par He his able to walk with out pain.  He stopped taking pain medications.  He still has tightness  in his back at the end of the day\par \par His medications were approved by Worker's Compensation\par He has been performing stretching and simple exercises in the gym to increase his endurance and back strength\par \par He attends restorative physical therapy 2 times a week for 8 sessions for  36  sessions.  He has not had therapy in the last 6 weeks.\par Therapy continues to focus on work conditioning and increasing lifting capacity. He is able to lift  50 pounds 1 time. \par \par Low back pain\par Pain:  3/10 Worse: 10/10 Quality: sharp Frequency: constant\par The pain starts on the right side and radiates to the left lateral side\par No radiation down the leg\par The pain is worse with bending\par Aggravated with walking and sitting more than 60 minutes.\par \par He only uses methocarbamol and NSAIDs as needed for pain\par \par No previous WC case\par No previous accident to the low back\par \par Functional deficits\par He is able to perform ADLs but very slowly due to pain\par He has increased pain in his low back with lifting.  He has increased pain with walking more than several blocks.\par \par He drove to the office\par He has been off work since the day of the accident.  He has returned to work and is working at a light duty status of no lifting more than 25 pounds sitting alternating with standing.\par

## 2023-06-15 NOTE — PHYSICAL EXAM
[FreeTextEntry1] : Pleasant, in no distress. Language: English\par HEENT: Head: no trauma. Eyes: no discharge. Ears: No discharge. Nose No discharge. Throat: clear\par Neck: FAROM. Negative Spurlings\par Heart: RR, +S1, S2\par Lungs: CTA\par Abdomen: soft, NT\par Lumbar spine: AROM 0-80, less diffuse spasms of the left greater than right lumbar paraspinals\par \par LUE: Shoulder:FAROM, MS 5/5\par Elbow: FAROM, MS 5/5 reflexes 2/4\par Wrist: FAROM, MS 5/5 reflexes 2/4\par Warm, nontender, pulse 2+\par \par RUE:Shoulder:FAROM, MS 5/5\par Elbow: FAROM, MS 5/5 reflexes 2/4\par Wrist: FAROM, MS 5/5 reflexes 2/4\par Warm, nontender, pulse 2+\par \par LLE: Hip: FAROM, MS 5-/5\par Knee: FAROM, MS 5-/5 reflexes 2/4\par Ankle: FAROM, MS 5/5 reflexes 2/4\par Warm , nontender, pulse 2+ negative homans\par \par RLE: Hip: FAROM, MS 5-/5\par Knee: FAROM, MS 5-/5 reflexes 1/4\par Ankle: FAROM, MS 5/5 reflexes 2/4\par Warm , nontender, pulse 2+ negative homans\par \par Gait: Spontaneous, reciprocal, safe without an assistive device\par he is able to bend at the waist until finger tips are 36 inches off the floor.\par \par Sensation\par RUE: sensation is intact to light touch, pinprick  and proprioception\par LUE: sensation is intact to light touch, pinprick  and proprioception\par RLE: sensation is intact to light touch, pinprick  and proprioception. Neg SLR. Neg JELANI, Neg FADIR\par LLE: sensation is intact to light touch, pinprick  and proprioception positive SLR. Neg JELANI, Neg FADIR\par

## 2023-06-20 ENCOUNTER — RX RENEWAL (OUTPATIENT)
Age: 53
End: 2023-06-20

## 2023-06-20 RX ORDER — NAPROXEN 500 MG/1
500 TABLET ORAL
Qty: 60 | Refills: 0 | Status: ACTIVE | COMMUNITY
Start: 2023-01-19 | End: 1900-01-01

## 2023-08-08 ENCOUNTER — APPOINTMENT (OUTPATIENT)
Dept: PHYSICAL MEDICINE AND REHAB | Facility: CLINIC | Age: 53
End: 2023-08-08
Payer: OTHER MISCELLANEOUS

## 2023-08-08 VITALS
OXYGEN SATURATION: 99 % | TEMPERATURE: 97.6 F | WEIGHT: 198 LBS | RESPIRATION RATE: 17 BRPM | HEIGHT: 65 IN | HEART RATE: 96 BPM | DIASTOLIC BLOOD PRESSURE: 77 MMHG | BODY MASS INDEX: 32.99 KG/M2 | SYSTOLIC BLOOD PRESSURE: 128 MMHG

## 2023-08-08 DIAGNOSIS — Z02.6 ENCOUNTER FOR EXAMINATION FOR INSURANCE PURPOSES: ICD-10-CM

## 2023-08-08 DIAGNOSIS — Z00.8 ENCOUNTER FOR OTHER GENERAL EXAMINATION: ICD-10-CM

## 2023-08-08 DIAGNOSIS — M54.16 RADICULOPATHY, LUMBAR REGION: ICD-10-CM

## 2023-08-08 PROCEDURE — 99213 OFFICE O/P EST LOW 20 MIN: CPT

## 2023-08-10 PROBLEM — M54.16 LUMBAR RADICULOPATHY: Status: ACTIVE | Noted: 2022-12-19

## 2023-08-10 PROBLEM — Z02.6 ENCOUNTER RELATED TO WORKER'S COMPENSATION CLAIM: Status: ACTIVE | Noted: 2022-12-19

## 2023-08-10 PROBLEM — Z00.8 ENCOUNTER FOR WORK CAPABILITY ASSESSMENT: Status: ACTIVE | Noted: 2022-12-19

## 2023-08-10 NOTE — HISTORY OF PRESENT ILLNESS
[FreeTextEntry1] : Workers Compensation Date of Accident 10.28. 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 He was was trying to remove a manual pallet jack from a pallet of medical supplies  The jack became stuck. He pulled the jack hard toward him. The jack ran into him and he fell backward. He landed hard on his back. No LOC. He had low back pain at the scene. He went to ER at [facility],  CT scan of the lumbar spine did not reveal a fracture  X-ray of the lumbar spine performed on October 20, 2022 reveals no fracture  He was seen by Dr. Анна Silva,  chiropracter. He has been to chiropractic care for 3 times a week. He had persistent pain and was referred to Dr. Carrillo of [facility] pain management for an evaluation..  MRI of the lumbar spine performed on December 9, 2022 reveals there is a disc bulge resulting in mild to moderate bilateral foraminal stenosis with impingement on the exiting L5 roots.  There is mild right and moderate left facet arthropathy with a small left facet effusion. Received trigger points to the low back.  He has persistent pain.  He had the injection May 16, 2023. he has increased ROM of the back.  His low back pain improved significantly He recently followed up with Dr. Medina who has a pain approval for epidural blocks  Patient has returned to work at full work capacity last month.  He only has a mild ache in his back at the end of the day.  This resolves by the next morning.  He does not lift more than 50 pounds. He  now has an assistant who would help him he should he need to lift more than 50 pounds. He his able to walk with out pain.  He stopped taking pain medications.  He still has tightness  in his back at the end of the day  His medications were approved by Worker's Compensation He has been performing stretching and simple exercises in the gym to increase his endurance and back strength  He attended  restorative physical therapy 2 times a week for  36  sessions.  He has not had therapy in the last 10 weeks.  Low back pain Pain:  1/10 Worse: 10/10 Quality: sharp Frequency: constant The pain starts on the right side and radiates to the left lateral side No radiation down the leg The pain is worse with bending Aggravated with walking and sitting more than 60 minutes.  He only uses methocarbamol and NSAIDs as needed for pain  No previous WC case No previous accident to the low back  Functional deficits He is able to perform ADLs but very slowly due to pain He has increased pain in his low back with lifting.  He has increased pain with walking more than several blocks.  He drove to the office He has been off work since the day of the accident.  He has returned to work and is working at a light duty status of no lifting more than 25 pounds sitting alternating with standing.

## 2023-08-10 NOTE — PHYSICAL EXAM
[FreeTextEntry1] : Pleasant, in no distress. Language: English HEENT: Head: no trauma. Eyes: no discharge. Ears: No discharge. Nose No discharge. Throat: clear Neck: FAROM. Negative Spurlings Heart: RR, +S1, S2 Lungs: CTA Abdomen: soft, NT Lumbar spine: AROM 0-90, Mild spasms of the bilateral lumbar paraspinal muscles   LUE: Shoulder:FAROM, MS 5/5 Elbow: FAROM, MS 5/5 reflexes 2/4 Wrist: FAROM, MS 5/5 reflexes 2/4 Warm, nontender, pulse 2+  RUE:Shoulder:FAROM, MS 5/5 Elbow: FAROM, MS 5/5 reflexes 2/4 Wrist: FAROM, MS 5/5 reflexes 2/4 Warm, nontender, pulse 2+  LLE: Hip: FAROM, MS 5-/5 Knee: FAROM, MS 5-/5 reflexes 1/4 Ankle: FAROM, MS 5/5 reflexes 2/4 Warm , nontender, pulse 2+ negative homans  RLE: Hip: FAROM, MS 5-/5 Knee: FAROM, MS 5-/5 reflexes 1/4 Ankle: FAROM, MS 5/5 reflexes 2/4 Warm , nontender, pulse 2+ negative homans  Gait: Spontaneous, reciprocal, safe without an assistive device he is able to bend at the waist until finger tips are 36 inches off the floor.  Sensation RUE: sensation is intact to light touch, pinprick  and proprioception LUE: sensation is intact to light touch, pinprick  and proprioception RLE: sensation is intact to light touch, pinprick  and proprioception. Neg SLR. Neg JELANI, Neg FADIR LLE: sensation is intact to light touch, pinprick  and proprioception positive SLR. Neg JELANI, Neg FADIR

## 2023-09-27 ENCOUNTER — APPOINTMENT (OUTPATIENT)
Dept: INTERNAL MEDICINE | Facility: CLINIC | Age: 53
End: 2023-09-27
Payer: COMMERCIAL

## 2023-09-27 VITALS
HEART RATE: 103 BPM | TEMPERATURE: 98.3 F | HEIGHT: 65 IN | BODY MASS INDEX: 32.99 KG/M2 | SYSTOLIC BLOOD PRESSURE: 148 MMHG | OXYGEN SATURATION: 98 % | DIASTOLIC BLOOD PRESSURE: 88 MMHG | WEIGHT: 198 LBS | RESPIRATION RATE: 17 BRPM

## 2023-09-27 DIAGNOSIS — E11.9 TYPE 2 DIABETES MELLITUS W/OUT COMPLICATIONS: ICD-10-CM

## 2023-09-27 DIAGNOSIS — M79.672 PAIN IN RIGHT FOOT: ICD-10-CM

## 2023-09-27 DIAGNOSIS — M79.671 PAIN IN RIGHT FOOT: ICD-10-CM

## 2023-09-27 DIAGNOSIS — E78.5 HYPERLIPIDEMIA, UNSPECIFIED: ICD-10-CM

## 2023-09-27 DIAGNOSIS — I10 ESSENTIAL (PRIMARY) HYPERTENSION: ICD-10-CM

## 2023-09-27 PROCEDURE — 99214 OFFICE O/P EST MOD 30 MIN: CPT

## 2023-10-04 PROBLEM — M79.671 PAIN IN BOTH FEET: Status: ACTIVE | Noted: 2023-09-27

## 2023-10-04 PROBLEM — E11.9 DIABETES MELLITUS TYPE 2, CONTROLLED: Status: ACTIVE | Noted: 2021-12-17

## 2023-10-06 LAB — URATE SERPL-MCNC: 4.1 MG/DL

## 2023-10-13 PROBLEM — E78.5 HLD (HYPERLIPIDEMIA): Status: ACTIVE | Noted: 2023-04-11

## 2023-10-13 PROBLEM — I10 HTN (HYPERTENSION): Status: ACTIVE | Noted: 2023-04-11

## 2023-10-28 ENCOUNTER — RX RENEWAL (OUTPATIENT)
Age: 53
End: 2023-10-28

## 2023-10-30 RX ORDER — DICLOFENAC SODIUM 75 MG/1
75 TABLET, DELAYED RELEASE ORAL
Qty: 30 | Refills: 0 | Status: ACTIVE | COMMUNITY
Start: 2023-09-27 | End: 1900-01-01

## 2023-11-01 NOTE — REVIEW OF SYSTEMS
[Joint Pain] : joint pain [Joint Stiffness] : joint stiffness [Muscle Pain] : muscle pain [Eye Pain] : no eye pain [Earache] : no earache [Chest Pain] : no chest pain [Shortness Of Breath] : no shortness of breath [Abdominal Pain] : no abdominal pain I was present for and supervised the key/critical aspects of the procedures performed during the care of the patient. [Dysuria] : no dysuria [Skin Wound] : no skin wound [Dizziness] : no dizziness [Insomnia] : no insomnia [Easy Bruising] : no tendency for easy bruising

## 2023-11-13 NOTE — COUNSELING
[Fall prevention counseling provided] : Fall prevention counseling provided [Adequate lighting] : Adequate lighting [No throw rugs] : No throw rugs [Use proper foot wear] : Use proper foot wear [Behavioral health counseling provided] : Behavioral health counseling provided [Sleep ___ hours/day] : Sleep [unfilled] hours/day [Engage in a relaxing activity] : Engage in a relaxing activity [Plan in advance] : Plan in advance [AUDIT-C Screening administered and reviewed] : AUDIT-C Screening administered and reviewed [Hazards of at-risk alcohol use discussed] : Hazards of at-risk alcohol use discussed [Strategies to reduce or eliminate alcohol use discussed] : Strategies to reduce or eliminate alcohol use discussed [Quit Drinking] : Quit Drinking [Potential consequences of obesity discussed] : Potential consequences of obesity discussed [Benefits of weight loss discussed] : Benefits of weight loss discussed [Structured Weight Management Program suggested:] : Structured weight management program suggested [Encouraged to maintain food diary] : Encouraged to maintain food diary [Encouraged to increase physical activity] : Encouraged to increase physical activity [Encouraged to use exercise tracking device] : Encouraged to use exercise tracking device [Target Wt Loss Goal ___] : Weight Loss Goals: Target weight loss goal [unfilled] lbs Arava Counseling:  Patient counseled regarding adverse effects of Arava including but not limited to nausea, vomiting, abnormalities in liver function tests. Patients may develop mouth sores, rash, diarrhea, and abnormalities in blood counts. The patient understands that monitoring is required including LFTs and blood counts.  There is a rare possibility of scarring of the liver and lung problems that can occur when taking methotrexate. Persistent nausea, loss of appetite, pale stools, dark urine, cough, and shortness of breath should be reported immediately. Patient advised to discontinue Arava treatment and consult with a physician prior to attempting conception. The patient will have to undergo a treatment to eliminate Arava from the body prior to conception. [Weigh Self Weekly] : weigh self weekly [Decrease Portions] : decrease portions [____ min/wk Activity] : [unfilled] min/wk activity [Keep Food Diary] : keep food diary [None] : None [FreeTextEntry1] : 8 minutes [FreeTextEntry4] : 8 minutes [de-identified] : Medical Annual wellness visit completed:\par HRA completed and reviewed with patient\par Medical, family, surgical history reviewed with patient and updated\par List of current providers r/w patient and updated\par Vitals, BMI reviewed and discussed along with healthy BMI goals. Dietary counseling x 15 minutes provided\par Depression PHQ 9 completed and reviewed \par Annual safety assessment reviewed\par discussed advanced directives\par smoking cessation counseling provided\par Established routine screening and immunization schedules\par Medical Annual wellness visit completed:\par HRA completed and reviewed with patient\par Medical, family, surgical history reviewed with patient and updated\par List of current providers r/w patient and updated\par Vitals, BMI reviewed and discussed along with healthy BMI goals. Dietary counseling x 15 minutes provided\par Depression PHQ 9 completed and reviewed \par Annual safety assessment reviewed\par discussed advanced directives\par smoking cessation counseling provided\par Established routine screening and immunization schedules\par \par VACCINATION & OTHER TX RECOMMENDATIONS\par \par ASA preventative therapy\par Calcium/Vitamin D supplementation\par  \par Dietary counseling, nutrition referral\par risks vs. benefits d/w patient. routine vaccination and vaccination schedules and recommendation d/w patient\par \par Vaccines recommended: \par * pneumovax (once after 65) & prevnar\par * annual Influenza vaccine\par * Hep B vaccines\par * zostavax\par * Tdap\par \par Colorectal screening recommended; screening colonoscopy q10yr, flex sig q5yr, annual fecal occult testing\par BMD recommended biennially for osteoporosis screening\par Glaucoma screening recommended, annual optho evals\par Cardiovascular screening and blood tests recommended and discussed w/ patient, cholesterol screening and dietary counseling\par AAA recommended x 1\par \par DIABETIC recommendations:\par med nutrition counseling, nutrition referral \par annual podiatry evaluations and follow up\par annual optho eval/retinal exams\par routine blood tets, including FBS, a1c% and cholesterol panels\par \par diet and exercise weight loss.  Low-salt low-fat ADA diet/ htn- Discussed diabetes physiology\par - Discussed importance of monitoring blood glucose levels\par - Encouraged a low fat/low cholesterol diet\par - Discussed symptoms of hyperglycemia and hypoglycemia\par - Discussed ADA glucose goals\par - Discussed  HGB A1c and the effects of blood glucose on the level\par - Discussed Healthy eating, avoidance of concentrated sweets, and to include vegetables by at least 2 meals a day\par - Discussed regular exercise\par - Discussed importance of follow up physician visits Limit intake of Sodium (Salt) to less than 2 grams a day to prevent fluid retention-swelling or worsening of symptoms. The importance of keeping the blood pressure at or below 130/80 to prevent stroke, heart attacks, kidney failure, blindness, and loss of limbs was  low chol diet. Avoid fried foods, red meat, butter, eggs, hard cheeses. Use canola or olive oil preferred. ::  was established in which goals would be set, monitoring would be done, and problem solving would also be addressed. The patient would be assisted using behavior change techniques, such as self-help and counseling through behavioral modification: Problem solving using hypnosis and positive medical reinforcement to achieve agreed-upon goals.\par .covod\par

## 2025-09-09 ENCOUNTER — APPOINTMENT (OUTPATIENT)
Dept: INTERNAL MEDICINE | Facility: CLINIC | Age: 55
End: 2025-09-09